# Patient Record
Sex: FEMALE | Race: BLACK OR AFRICAN AMERICAN | NOT HISPANIC OR LATINO | Employment: OTHER | URBAN - METROPOLITAN AREA
[De-identification: names, ages, dates, MRNs, and addresses within clinical notes are randomized per-mention and may not be internally consistent; named-entity substitution may affect disease eponyms.]

---

## 2017-05-09 PROBLEM — E53.8 B12 DEFICIENCY: Status: ACTIVE | Noted: 2017-05-09

## 2017-05-09 PROBLEM — E61.1 IRON DEFICIENCY: Status: ACTIVE | Noted: 2017-05-09

## 2017-11-29 ENCOUNTER — TELEPHONE (OUTPATIENT)
Dept: ADMINISTRATIVE | Facility: HOSPITAL | Age: 67
End: 2017-11-29

## 2020-02-28 PROBLEM — F33.0 MILD EPISODE OF RECURRENT MAJOR DEPRESSIVE DISORDER: Status: ACTIVE | Noted: 2020-02-28

## 2020-02-28 PROBLEM — F51.01 PRIMARY INSOMNIA: Status: ACTIVE | Noted: 2020-02-28

## 2020-02-28 PROBLEM — Z76.89 ENCOUNTER TO ESTABLISH CARE: Status: ACTIVE | Noted: 2020-02-28

## 2020-02-28 PROBLEM — Z79.899 CHRONIC PRESCRIPTION BENZODIAZEPINE USE: Status: ACTIVE | Noted: 2020-02-28

## 2020-02-28 PROBLEM — E78.00 PURE HYPERCHOLESTEROLEMIA: Status: ACTIVE | Noted: 2020-02-28

## 2020-03-16 ENCOUNTER — PATIENT OUTREACH (OUTPATIENT)
Dept: ADMINISTRATIVE | Facility: HOSPITAL | Age: 70
End: 2020-03-16

## 2020-03-16 DIAGNOSIS — Z12.31 ENCOUNTER FOR SCREENING MAMMOGRAM FOR MALIGNANT NEOPLASM OF BREAST: Primary | ICD-10-CM

## 2020-04-15 ENCOUNTER — TELEPHONE (OUTPATIENT)
Dept: ADMINISTRATIVE | Facility: HOSPITAL | Age: 70
End: 2020-04-15

## 2020-07-22 ENCOUNTER — OFFICE VISIT (OUTPATIENT)
Dept: URGENT CARE | Facility: CLINIC | Age: 70
End: 2020-07-22
Payer: MEDICARE

## 2020-07-22 VITALS
RESPIRATION RATE: 16 BRPM | WEIGHT: 145 LBS | SYSTOLIC BLOOD PRESSURE: 168 MMHG | OXYGEN SATURATION: 97 % | HEIGHT: 59 IN | BODY MASS INDEX: 29.23 KG/M2 | TEMPERATURE: 100 F | DIASTOLIC BLOOD PRESSURE: 73 MMHG | HEART RATE: 76 BPM

## 2020-07-22 DIAGNOSIS — S61.211A LACERATION OF LEFT INDEX FINGER WITHOUT FOREIGN BODY WITHOUT DAMAGE TO NAIL, INITIAL ENCOUNTER: Primary | ICD-10-CM

## 2020-07-22 PROCEDURE — 12001 RPR S/N/AX/GEN/TRNK 2.5CM/<: CPT | Mod: S$GLB,,, | Performed by: NURSE PRACTITIONER

## 2020-07-22 PROCEDURE — 99214 PR OFFICE/OUTPT VISIT, EST, LEVL IV, 30-39 MIN: ICD-10-PCS | Mod: 25,S$GLB,, | Performed by: NURSE PRACTITIONER

## 2020-07-22 PROCEDURE — 99214 OFFICE O/P EST MOD 30 MIN: CPT | Mod: 25,S$GLB,, | Performed by: NURSE PRACTITIONER

## 2020-07-22 PROCEDURE — 12001 LACERATION REPAIR: ICD-10-PCS | Mod: S$GLB,,, | Performed by: NURSE PRACTITIONER

## 2020-07-22 NOTE — PATIENT INSTRUCTIONS
Laceration, Chin, Skin Glue Repair  A laceration is a cut through the skin. If you have a chin laceration, skin glue may be used to repair the cut. Skin glue is usually used on cuts that are shallow, have smooth edges, and are not infected. A lower layer of skin may be closed with sutures before skin glue is applied. Skin glue provides a water-resistant covering that allows for fast healing. No bandages are required and skin glue peels off on its own within 5 to 10 days.  Home care  Medicines:  Acetaminophen or ibuprofen may be taken for pain, unless another pain medicine was prescribed. If you have chronic liver or kidney disease, talk with your doctor before using these medicines. Also talk with your doctor if you have ever had a stomach ulcer or gastrointestinal bleeding.  General care  · Keep the wound clean and dry. You may shower or bathe as usual, but do not use soaps, lotions, or ointments on the wound area. Do not scrub the wound. After bathing, pat the wound dry with a soft towel.  · Avoid soaking the laceration in water for 7 to 10 days. Use a clean cloth to gently pat the area dry if it gets wet.  · Do not scratch, rub, or pick at the skin glue. Do not place tape directly over the skin glue.  · Do not apply liquids (such as peroxide), ointments, or creams to the wound while the skin glue is in place.  · If the adhesive does not peel off after 10 days, apply petroleum jelly or an antibiotic ointment to the area.  · Most chin wounds heal without problems. However, an infection sometimes occurs despite proper treatment. Therefore, watch for the signs of infection listed below.  · Certain types of skin glues cannot be used if you have an allergy to latex or formaldehyde. Tell your doctor right away about your allergies.  Follow-up care  Follow up with your healthcare provider, or as advised. The skin glue will fall off naturally in 5 to 10 days.  When to seek medical advice  Call your healthcare provider  right away if any of these occur:  · Signs of infection:  ¨ Fever of 100.4°F (38ºC) or higher, or as directed by your health care provider  ¨ Increasing pain in the wound  ¨ Increasing redness or swelling  ¨ Pus coming from the wound  · Wound bleeds more than a small amount or bleeding doesnt stop  · Wound edges come apart  Date Last Reviewed: 9/1/2016  © 3391-8631 [employer]. [address] [place] [address], [place] [address]. All rights reserved. This information is not intended as a substitute for professional medical care. Always follow your healthcare professional's instructions.      1.  Take all medications as directed. If you have been prescribed antibiotics, make sure to complete them.   2.  Rest and keep yourself/patient well hydrated. For adults, it is recommended to drink at least 8-10 glasses of water daily.   3.  For patients above 6 months of age who are not allergic to and are not on anticoagulants, you can alternate Tylenol and Motrin every 4-6 hours for fever above 100.4F and/or pain.  For patients less than 6 months of age, allergic to or intolerant to NSAIDS, have gastritis, gastric ulcers, or history of GI bleeds, are pregnant, or are on anticoagulant therapy, you can take Tylenol every 4 hours as needed for fever above 100.4F and/or pain.   4. You should schedule a follow-up appointment with your Primary Care Provider/Pediatrician for recheck in 2-3 days or as directed at this visit.   5.  If your condition fails to improve in a timely manner, you should receive another evaluation by your Primary Care Provider/Pediatrician to discuss your concerns or return to urgent care for a recheck.  If your condition worsens at any time, you should report immediately to your nearest Emergency Department for further evaluation. **You must understand that you have received Urgent Care treatment only and that you may be released before all of your medical problems are known or treated. You, the  patient, are responsible to arrange for follow-up care as instructed.

## 2020-07-22 NOTE — PROCEDURES
"Laceration Repair    Date/Time: 2020 4:50 PM  Performed by: Nuzhat Hughes NP  Authorized by: Nuzhat Hughes NP   Consent Done: Yes  Consent: Verbal consent obtained. Written consent not obtained.  Risks and benefits: risks, benefits and alternatives were discussed  Consent given by: patient  Patient understanding: patient states understanding of the procedure being performed  Patient consent: the patient's understanding of the procedure matches consent given  Relevant documents: relevant documents present and verified  Site marked: the operative site was marked  Required items: required blood products, implants, devices, and special equipment available  Patient identity confirmed: name and   Time out: Immediately prior to procedure a "time out" was called to verify the correct patient, procedure, equipment, support staff and site/side marked as required.  Body area: upper extremity  Location details: left index finger  Laceration length: 0.5 cm  Foreign bodies: no foreign bodies  Tendon involvement: none  Nerve involvement: none  Vascular damage: no  Patient sedated: no  Preparation: Patient was prepped and draped in the usual sterile fashion.  Irrigation solution: saline  Irrigation method: syringe  Amount of cleaning: standard  Debridement: none  Degree of undermining: none  Skin closure: glue  Approximation: close  Approximation difficulty: simple  Dressing: open (no dressing)  Patient tolerance: Patient tolerated the procedure well with no immediate complications        "

## 2021-07-01 ENCOUNTER — PATIENT MESSAGE (OUTPATIENT)
Dept: ADMINISTRATIVE | Facility: OTHER | Age: 71
End: 2021-07-01